# Patient Record
Sex: MALE | URBAN - METROPOLITAN AREA
[De-identification: names, ages, dates, MRNs, and addresses within clinical notes are randomized per-mention and may not be internally consistent; named-entity substitution may affect disease eponyms.]

---

## 2019-04-30 ENCOUNTER — OFFICE VISIT (OUTPATIENT)
Dept: URBAN - METROPOLITAN AREA CLINIC 76 | Facility: CLINIC | Age: 69
End: 2019-04-30
Payer: COMMERCIAL

## 2019-04-30 DIAGNOSIS — H25.13 AGE-RELATED NUCLEAR CATARACT, BILATERAL: Primary | ICD-10-CM

## 2019-04-30 DIAGNOSIS — H31.012 MACULA SCARS OF POSTERIOR POLE (POSTINFLAMMATORY) (POST-TRAUMATIC), LEFT EYE: ICD-10-CM

## 2019-04-30 DIAGNOSIS — H35.371 PUCKERING OF MACULA, RIGHT EYE: ICD-10-CM

## 2019-04-30 PROCEDURE — 92014 COMPRE OPH EXAM EST PT 1/>: CPT | Performed by: OPHTHALMOLOGY

## 2019-04-30 PROCEDURE — 92134 CPTRZ OPH DX IMG PST SGM RTA: CPT | Performed by: OPHTHALMOLOGY

## 2019-04-30 ASSESSMENT — VISUAL ACUITY
OS: 20/30
OD: 20/25

## 2019-04-30 ASSESSMENT — KERATOMETRY
OD: 43.75
OS: 43.88

## 2019-04-30 ASSESSMENT — INTRAOCULAR PRESSURE
OS: 16
OD: 16

## 2019-04-30 NOTE — IMPRESSION/PLAN
Impression: Age-related nuclear cataract, bilateral: H25.13. OU Plan: Cataracts account for the patient's complaints. Patient would like to hold off on cataract sx at this time. Patient is not having significant visual difficulties.

## 2019-04-30 NOTE — IMPRESSION/PLAN
Impression: Puckering of macula, right eye: H35.371. OD. Plan: Discussed diagnosis in detail with patient. Will continue to observe condition and or symptoms. Patient will need retinal consult w/ Dr Jeovanny Murdock prior to cataract sx.

## 2019-04-30 NOTE — IMPRESSION/PLAN
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 OS. Plan: Discussed diagnosis in detail with patient. Will continue to observe condition and or symptoms.